# Patient Record
Sex: MALE | Race: WHITE | Employment: FULL TIME | URBAN - METROPOLITAN AREA
[De-identification: names, ages, dates, MRNs, and addresses within clinical notes are randomized per-mention and may not be internally consistent; named-entity substitution may affect disease eponyms.]

---

## 2018-02-24 ENCOUNTER — NURSE TRIAGE (OUTPATIENT)
Dept: NURSING | Facility: CLINIC | Age: 33
End: 2018-02-24

## 2018-02-24 NOTE — TELEPHONE ENCOUNTER
He wanted to know the urgent care hours and if he needs an appointment. I reviewed the hours with him and told him it's on a walk in basis.  Ana Paula Yanez RN-[facility]

## 2018-10-25 ENCOUNTER — NURSE TRIAGE (OUTPATIENT)
Dept: NURSING | Facility: CLINIC | Age: 33
End: 2018-10-25

## 2018-10-25 ENCOUNTER — HOSPITAL ENCOUNTER (EMERGENCY)
Facility: CLINIC | Age: 33
Discharge: HOME | End: 2018-10-25
Attending: EMERGENCY MEDICINE | Admitting: EMERGENCY MEDICINE
Payer: COMMERCIAL

## 2018-10-25 ENCOUNTER — APPOINTMENT (OUTPATIENT)
Dept: CT IMAGING | Facility: CLINIC | Age: 33
End: 2018-10-25
Attending: EMERGENCY MEDICINE
Payer: COMMERCIAL

## 2018-10-25 VITALS
OXYGEN SATURATION: 98 % | HEIGHT: 74 IN | WEIGHT: 208 LBS | DIASTOLIC BLOOD PRESSURE: 89 MMHG | HEART RATE: 82 BPM | SYSTOLIC BLOOD PRESSURE: 140 MMHG | BODY MASS INDEX: 26.69 KG/M2 | TEMPERATURE: 97.3 F | RESPIRATION RATE: 18 BRPM

## 2018-10-25 DIAGNOSIS — S09.93XA SOFT PALATE INJURY, INITIAL ENCOUNTER: ICD-10-CM

## 2018-10-25 PROCEDURE — 25000128 H RX IP 250 OP 636: Performed by: EMERGENCY MEDICINE

## 2018-10-25 PROCEDURE — 99284 EMERGENCY DEPT VISIT MOD MDM: CPT | Mod: 25

## 2018-10-25 PROCEDURE — 70491 CT SOFT TISSUE NECK W/DYE: CPT

## 2018-10-25 RX ORDER — IBUPROFEN 100 MG/5ML
600 SUSPENSION, ORAL (FINAL DOSE FORM) ORAL ONCE
Status: DISCONTINUED | OUTPATIENT
Start: 2018-10-25 | End: 2018-10-25 | Stop reason: HOSPADM

## 2018-10-25 RX ORDER — CEPHALEXIN 500 MG/1
500 CAPSULE ORAL 3 TIMES DAILY
Qty: 21 CAPSULE | Refills: 0 | Status: SHIPPED | OUTPATIENT
Start: 2018-10-25 | End: 2018-11-01

## 2018-10-25 RX ORDER — IOPAMIDOL 755 MG/ML
500 INJECTION, SOLUTION INTRAVASCULAR ONCE
Status: COMPLETED | OUTPATIENT
Start: 2018-10-25 | End: 2018-10-25

## 2018-10-25 RX ADMIN — SODIUM CHLORIDE 65 ML: 9 INJECTION, SOLUTION INTRAVENOUS at 08:56

## 2018-10-25 RX ADMIN — IOPAMIDOL 80 ML: 755 INJECTION, SOLUTION INTRAVENOUS at 08:56

## 2018-10-25 ASSESSMENT — ENCOUNTER SYMPTOMS
SHORTNESS OF BREATH: 0
SPEECH DIFFICULTY: 0
SORE THROAT: 1
WOUND: 1
TROUBLE SWALLOWING: 0

## 2018-10-25 NOTE — ED AVS SNAPSHOT
[facility] Emergency Department    [address]    [facility] [address]    Phone:  [phone]    Fax:  [phone]                                       Felipe Mckee   MRN: [id number]    Department:  [facility] Emergency Department   Date of Visit:  10/25/2018           After Visit Summary Signature Page     I have received my discharge instructions, and my questions have been answered. I have discussed any challenges I see with this plan with the nurse or doctor.    ..........................................................................................................................................  Patient/Patient Representative Signature      ..........................................................................................................................................  Patient Representative Print Name and Relationship to Patient    ..................................................               ................................................  Date                                   Time    ..........................................................................................................................................  Reviewed by Signature/Title    ...................................................              ..............................................  Date                                               Time          22EPIC Rev 08/18

## 2018-10-25 NOTE — TELEPHONE ENCOUNTER
"Patient calling reporting having \"an accident in the shower this morning my tooth brush hit the back of my throat.\" Patient reporting puncture wound to back of throat. Area continues to bleed. Denies difficulty breathing.   Denies feeling dizzy or lightheaded.  Advised Emergency Room. Patient agrees to go to [facility].    Kristin Carlin RN  [facility]         Reason for Disposition    Injury to the back of the throat, tonsil or soft palate  (e.g., pencil or other sharp object placed in mouth)    Additional Information    Negative: [1] Severe difficulty swallowing (e.g., drooling or spitting) AND [2] started suddenly after taking a medicine or allergic food    Negative: [1] Swollen tongue AND [2] sudden onset    Negative: Wheezing, stridor, hoarseness, or difficulty breathing    Negative: Sounds like a life-threatening emergency to the triager    Negative: Mouth ulcers are seen    Negative: Sore throat (throat pain with swallowing)    Negative: Swallowed a (non-edible) foreign body    Negative: Feeding tube, questions or concerns related to    Negative: Swelling of tongue    Negative: [1] Symptoms of blocked esophagus (e.g., can't swallow normal secretions, drooling) AND [2] present now    Negative: Symptoms of food or bone stuck in throat or esophagus (e.g., pain in throat or chest, FB sensation, blood-tinged saliva)    Negative: [1] Severe difficulty swallowing (e.g., drooling or spitting, can't swallow water) AND [2] new onset AND [3] normal breathing    Negative: Gaping cut of OUTER LIP  (or length > 1/4 inch or 6 mm)    Negative: Gaping cut through border of the LIP where it meets the skin  (or length > 1/4 inch or 6 mm)    Negative: Cut of side or tip of TONGUE that gapes open (split tongue)    Negative: Cut on surface of TONGUE > 1 inch (24 mm) and that gapes open    Negative: [1] Bleeding AND [2] won't stop after 10 minutes of direct pressure (using correct technique)    Negative: " Can't open or close the mouth fully    Negative: Sounds like a serious injury to the triager    Negative: Main injury is to the teeth    Negative: [1] Major bleeding (e.g., actively dripping or spurting) AND [2] can't be stopped    Negative: Fainted or too weak to stand following large blood loss    Negative: Knocked out (unconscious) > 1 minute    Negative: Difficult to awaken or acting confused  (e.g., disoriented, slurred speech)    Negative: Difficulty breathing    Negative: Severe neck pain    Negative: Sounds like a life-threatening emergency to the triager    Protocols used: MOUTH INJURY-ADULT-AH, SWALLOWING DIFFICULTY-ADULT-AH

## 2018-10-25 NOTE — ED PROVIDER NOTES
"  History     Chief Complaint:  Throat Injury    HPI   Felipe Mckee is an otherwise healthy 33 year old male who presents to the ED with a puncture wound to the back of his throat. The patient reports that this morning, around 0700, he was brushing his teeth in the shower when he slipped and his toothbrush lodged into the back of his throat resulting in a puncture wound. He has since been spitting up blood but denies numbness, dizziness, weakness or headache. While in ED the patient reports pain and localized swelling without trouble breathing or swallowing. He has not yet taken anything for pain or swelling control. No other symptoms reported such as changes in speech.     Allergies:  No known drug allergies    Medications:    The patient is not currently taking any prescribed medications.    Past Medical History:    The patient does not have any past pertinent medical history.    Past Surgical History:    History reviewed. No pertinent surgical history.    Family History:    History reviewed. No pertinent family history.     Social History:  The patient was not accompanied to the ED.  Smoking Status: Not on file  Smokeless Tobacco: Not on file  Alcohol Use: Not on file     Review of Systems   HENT: Positive for sore throat. Negative for trouble swallowing.         Positive for throat swelling    Respiratory: Negative for shortness of breath.    Skin: Positive for wound.   Neurological: Negative for speech difficulty.   All other systems reviewed and are negative.    Physical Exam     Patient Vitals for the past 24 hrs:   BP Temp Temp src Pulse Resp SpO2 Height Weight   10/25/18 0734 140/89 97.3  F (36.3  C) Temporal 82 18 98 % 1.88 m (6' 2\") 94.3 kg (208 lb)       Physical Exam   HENT:   Mouth/Throat:         General: the patient is awake and interactive; speaking in full sentences  HEENT:  Moist mucous membranes, conjunctiva normal; left posterior soft palate with 1 cm circular puncture wound; no active " bleeding; notable small blood clot to the puncture site; uvula midline; no soft palate swelling  Pulmonary:  Normal respiratory effort; no stridor  Cardiovascular:  Well perfused  Musculoskeletal:  Moving 4 extremities grossly wnl, no deformities  Neuro:  Speech normal, no focal deficits; CN II- XII grossly intact  Psych:  Normal affect; speech clear    Emergency Department Course     Imaging:  Radiology findings were communicated with the patient who voiced understanding of the findings.    Soft Tissue Neck CT w Contrast:  Normal CT scan of the neck.    As read by radiology    Interventions:  0856 - Isovue-370 solution 500 mL 80 mLs IV  0901 - NS Bolus 1,000mL IV    Emergency Department Course:  Nursing notes and vitals reviewed.    The patient was sent for CT imaging while in the emergency department, results above.     0744: I performed an exam of the patient as documented above.     0952: Patient rechecked and updated.     1027 : RN spoke with ENT service regarding patient's presentation, findings, and plan of care.  Unfortunately I was with a critical patient when ENT called.  Recommended 7 day course of antibiotic (Keflex or Clindamycin).  I was able to discuss this with the patient.    Findings and plan explained to the Patient. Patient discharged home with instructions regarding supportive care, medications, and reasons to return. The importance of close follow-up was reviewed.     Impression & Plan      Medical Decision Making:  Felipe Mckee is a 33 year old male with no significant past medical history presents the emergency department for evaluation of puncture wound to the soft palate as noted in HPI.  Initially had some bleeding and feels like there is some swelling but no evidence of active bleeding or significant swelling noted on exam.  He otherwise has no difficulty with swallowing or breathing.  Denies any neurologic symptoms and his neurologic exam is completely nonfocal.  Soft palate exam as  above shows 1 cm circular puncture wound to the soft palate laterally.  No other evidence of intraoral injury.  Discussed given the location of injury possible neurovascular damage and recommended CT scan and patient was in agreement with this.  CT soft tissue neck was otherwise unremarkable.  He did not have any worsening of his swelling and no change in his voice while in the emergency department.  RN was able to speak with on-call ENT regarding patient's presentation and unfortunately was unable to take this call as I was with a critical patient at the time.  Per RN, on-call ENT recommended starting 7 day course of antibiotic and this was discussed with the patient.  He was in agreement with this.  Discussed the natural course of this injury and at this time I do not feel that any ED suturing or OR repair is indicated.  Follow-up with primary as discussed at bedside.  He will return for any new or worsening symptoms.    Diagnosis:    ICD-10-CM    1. Soft palate injury, initial encounter S09.93XA        Disposition:  discharged to home      Viri Whitlock  10/25/2018   [facility] EMERGENCY DEPARTMENT  IViri am serving as a scribe at 7:44 AM on 10/25/2018 to document services personally performed by Tono Terrell MD based on my observations and the provider's statements to me.       Tono Terrell MD  10/25/18 2569

## 2018-10-25 NOTE — ED AVS SNAPSHOT
[facility] Emergency Department    [address]    [facility] [address]    Phone:  [phone]    Fax:  [phone]                                       Felipe Mckee   MRN: [id number]    Department:  [facility] Emergency Department   Date of Visit:  10/25/2018           Patient Information     Date Of Birth          1985        Your diagnoses for this visit were:     Soft palate injury, initial encounter        You were seen by Tono Terrell MD.      Follow-up Information     Follow up with [facility]- In 1 week.    Why:  As needed    Contact information:    [address]  [facility] [address]  [phone]          Follow up with [facility] Emergency Department.    Specialty:  EMERGENCY MEDICINE    Why:  If symptoms worsen, you develop fever, inability to swallow, have difficulty breathing, numbness/weakness/tingling of your extremities or any new concerning symptom    Contact information:    [address]  [facility] [id number] [phone]        Discharge Instructions       Your CT neck was reassuring and did not show any damage to the deep neck structures (i.e. Blood vessels).    Soft palate injuries heal well without any intervention (i.e. Sutures/stitching).      Please follow up with your primary care doctor in the next week as needed.    You can take Tylenol 325 mg by mouth or Ibuprofen 400 mg by mouth every 4-6 hours as needed for pain.    You are welcome to return to the emergency department if symptoms worsen, you develop fever, inability to swallow, have difficulty breathing, numbness/weakness/tingling of your extremities or any new concerning symptom          24 Hour Appointment Hotline       To make an appointment at any [facility]3], call [phone] ([phone]). If you don't have a family doctor or clinic, we will help you find one. [facility] are  conveniently located to serve the needs of you and your family.             Review of your medicines      START taking        Dose / Directions Last dose taken    cephALEXin 500 MG capsule   Commonly known as:  KEFLEX   Dose:  500 mg   Quantity:  21 capsule        Take 1 capsule (500 mg) by mouth 3 times daily for 7 days   Refills:  0                Prescriptions were sent or printed at these locations (1 Prescription)                   Other Prescriptions                Printed at Department/Unit printer (1 of 1)         cephALEXin (KEFLEX) 500 MG capsule                Procedures and tests performed during your visit     Soft tissue neck CT w contrast      Orders Needing Specimen Collection     None      Pending Results     Date and Time Order Name Status Description    10/25/2018 0751 Soft tissue neck CT w contrast Preliminary             Pending Culture Results     No orders found from 10/23/2018 to 10/26/2018.            Pending Results Instructions     If you had any lab results that were not finalized at the time of your Discharge, you can call the ED Lab Result RN at [phone]. You will be contacted by this team for any positive Lab results or changes in treatment. The nurses are available 7 days a week from 10A to 6:30P.  You can leave a message 24 hours per day and they will return your call.        Test Results From Your Hospital Stay        10/25/2018  9:24 AM      Narrative     CT SCAN OF THE NECK WITH CONTRAST  10/25/2018 9:09 AM     HISTORY: Toothbrush puncture wound to left soft palate.     TECHNIQUE: Axial images and coronal reformations. 80 mL Isovue-370 IV.  Radiation dose for this scan was reduced using automated exposure  control, adjustment of the mA and/or kV according to patient size, or  iterative reconstruction technique.    COMPARISON: None.    FINDINGS: There is no evidence for any soft tissue hematoma or any  significant injury to the left soft palate. There is no  adjacent  hematoma    Visualized sinuses, nasopharynx and orbits: Normal.      Tongue, oral cavity and oropharynx: Normal.      Hypopharynx: Normal.      Larynx and trachea: Normal.      Thyroid: Normal.    Submandibular glands: Normal.      Parotid glands: Normal.        Lymph nodes: Normal.      Vasculature: Normal.      Upper mediastinum and lungs: Normal.      Bones: Normal.        Impression     IMPRESSION: Normal CT scan of the neck.                Clinical Quality Measure: Blood Pressure Screening     Your blood pressure was checked while you were in the emergency department today. The last reading we obtained was  BP: 140/89 . Please read the guidelines below about what these numbers mean and what you should do about them.  If your systolic blood pressure (the top number) is less than 120 and your diastolic blood pressure (the bottom number) is less than 80, then your blood pressure is normal. There is nothing more that you need to do about it.  If your systolic blood pressure (the top number) is 120-139 or your diastolic blood pressure (the bottom number) is 80-89, your blood pressure may be higher than it should be. You should have your blood pressure rechecked within a year by a primary care provider.  If your systolic blood pressure (the top number) is 140 or greater or your diastolic blood pressure (the bottom number) is 90 or greater, you may have high blood pressure. High blood pressure is treatable, but if left untreated over time it can put you at risk for heart attack, stroke, or kidney failure. You should have your blood pressure rechecked by a primary care provider within the next 4 weeks.  If your provider in the emergency department today gave you specific instructions to follow-up with your doctor or provider even sooner than that, you should follow that instruction and not wait for up to 4 weeks for your follow-up visit.        Thank you for choosing [facility]       Thank you for choosing  "[facility] for your care. Our goal is always to provide you with excellent care. Hearing back from our patients is one way we can continue to improve our services. Please take a few minutes to complete the written survey that you may receive in the mail after you visit with us. Thank you!        [employer] Information     [employer] lets you send messages to your doctor, view your test results, renew your prescriptions, schedule appointments and more. To sign up, go to [email or web address] . Click on \"Log in\" on the left side of the screen, which will take you to the Welcome page. Then click on \"Sign up Now\" on the right side of the page.     You will be asked to enter the access code listed below, as well as some personal information. Please follow the directions to create your username and password.     Your access code is: [email or web address]  Expires: 2019 10:03 AM     Your access code will  in 90 days. If you need help or a new code, please call your [facility] clinic or [phone].        Care EveryWhere ID     This is your Care EveryWhere ID. This could be used by other organizations to access your [facility] medical records  [id number]        Equal Access to Services     ZAFAR NELSON : Luis A Hines, gloria ingram, qatashia chapman, bebeto borges. So [facility] [phone].    ATENCIÓN: Si habla español, tiene a adhikari disposición servicios gratuitos de asistencia lingüística. Llame al [phone].    We comply with applicable federal civil rights laws and [place] laws. We do not discriminate on the basis of race, color, national origin, age, disability, sex, sexual orientation, or gender identity.            After Visit Summary       This is your record. Keep this with you and show to your community pharmacist(s) and doctor(s) at your next visit.                  "

## 2018-10-25 NOTE — ED TRIAGE NOTES
ABCs intact. Pt was brushing his teeth in the shower and slipped. Pt punctured back of throat about 0700. Pt still has bleeding.

## 2018-10-25 NOTE — DISCHARGE INSTRUCTIONS
Your CT neck was reassuring and did not show any damage to the deep neck structures (i.e. Blood vessels).    Soft palate injuries heal well without any intervention (i.e. Sutures/stitching).      Please follow up with your primary care doctor in the next week as needed.    You can take Tylenol 325 mg by mouth or Ibuprofen 400 mg by mouth every 4-6 hours as needed for pain.    You are welcome to return to the emergency department if symptoms worsen, you develop fever, inability to swallow, have difficulty breathing, numbness/weakness/tingling of your extremities or any new concerning symptom

## 2018-10-25 NOTE — LETTER
October 25, 2018      To Whom It May Concern:      Felipe Mckee was seen in our Emergency Department today, 10/25/18.  I expect his condition to improve over the next 2-3 days.  He may return to work when improved.    Sincerely,        Tono Terrell MD

## 2025-06-26 ENCOUNTER — APPOINTMENT (OUTPATIENT)
Age: 40
Setting detail: DERMATOLOGY
End: 2025-06-26

## 2025-06-26 DIAGNOSIS — L82.1 OTHER SEBORRHEIC KERATOSIS: ICD-10-CM

## 2025-06-26 DIAGNOSIS — L30.9 DERMATITIS, UNSPECIFIED: ICD-10-CM

## 2025-06-26 DIAGNOSIS — Z71.89 OTHER SPECIFIED COUNSELING: ICD-10-CM

## 2025-06-26 DIAGNOSIS — D17 BENIGN LIPOMATOUS NEOPLASM: ICD-10-CM

## 2025-06-26 DIAGNOSIS — D22 MELANOCYTIC NEVI: ICD-10-CM

## 2025-06-26 DIAGNOSIS — D18.0 HEMANGIOMA: ICD-10-CM

## 2025-06-26 PROBLEM — D18.01 HEMANGIOMA OF SKIN AND SUBCUTANEOUS TISSUE: Status: ACTIVE | Noted: 2025-06-26

## 2025-06-26 PROBLEM — D22.5 MELANOCYTIC NEVI OF TRUNK: Status: ACTIVE | Noted: 2025-06-26

## 2025-06-26 PROBLEM — D17.1 BENIGN LIPOMATOUS NEOPLASM OF SKIN AND SUBCUTANEOUS TISSUE OF TRUNK: Status: ACTIVE | Noted: 2025-06-26

## 2025-06-26 PROCEDURE — ? SUNSCREEN RECOMMENDATIONS

## 2025-06-26 PROCEDURE — ? COUNSELING

## 2025-06-26 PROCEDURE — ? PRESCRIPTION

## 2025-06-26 PROCEDURE — ? ADDITIONAL NOTES

## 2025-06-26 PROCEDURE — ? PRESCRIPTION MEDICATION MANAGEMENT

## 2025-06-26 RX ORDER — CLOBETASOL PROPIONATE 0.5 MG/G
OINTMENT TOPICAL
Qty: 60 | Refills: 3 | Status: ERX | COMMUNITY
Start: 2025-06-26

## 2025-06-26 RX ADMIN — CLOBETASOL PROPIONATE: 0.5 OINTMENT TOPICAL at 00:00

## 2025-06-26 ASSESSMENT — LOCATION DETAILED DESCRIPTION DERM
LOCATION DETAILED: RIGHT VENTRAL PROXIMAL FOREARM
LOCATION DETAILED: RIGHT DISTAL POSTERIOR THIGH
LOCATION DETAILED: LEFT DISTAL PRETIBIAL REGION
LOCATION DETAILED: SUPERIOR LUMBAR SPINE
LOCATION DETAILED: SUPERIOR THORACIC SPINE
LOCATION DETAILED: RIGHT SUPERIOR MEDIAL MIDBACK
LOCATION DETAILED: LEFT VENTRAL PROXIMAL FOREARM
LOCATION DETAILED: EPIGASTRIC SKIN
LOCATION DETAILED: MIDDLE STERNUM
LOCATION DETAILED: LEFT PROXIMAL POSTERIOR THIGH
LOCATION DETAILED: RIGHT DISTAL PRETIBIAL REGION
LOCATION DETAILED: LEFT INFERIOR MEDIAL FOREHEAD
LOCATION DETAILED: INFERIOR THORACIC SPINE

## 2025-06-26 ASSESSMENT — LOCATION ZONE DERM
LOCATION ZONE: ARM
LOCATION ZONE: TRUNK
LOCATION ZONE: LEG
LOCATION ZONE: FACE

## 2025-06-26 ASSESSMENT — LOCATION SIMPLE DESCRIPTION DERM
LOCATION SIMPLE: LEFT FOREHEAD
LOCATION SIMPLE: LEFT PRETIBIAL REGION
LOCATION SIMPLE: ABDOMEN
LOCATION SIMPLE: RIGHT PRETIBIAL REGION
LOCATION SIMPLE: LEFT FOREARM
LOCATION SIMPLE: RIGHT FOREARM
LOCATION SIMPLE: RIGHT POSTERIOR THIGH
LOCATION SIMPLE: LOWER BACK
LOCATION SIMPLE: LEFT POSTERIOR THIGH
LOCATION SIMPLE: UPPER BACK
LOCATION SIMPLE: RIGHT LOWER BACK
LOCATION SIMPLE: CHEST

## 2025-06-26 NOTE — HPI: RASH
What Type Of Note Output Would You Prefer (Optional)?: Bullet Format
How Severe Is Your Rash?: mild
Is This A New Presentation, Or A Follow-Up?: Rash
Additional History: Patient was prescribed clobetasol 0.05% from a PCP for poison ivy. Every summer patient presents with rash.

## 2025-06-26 NOTE — PROCEDURE: ADDITIONAL NOTES
Additional Notes: We discussed consulting with an allergist as rash seems to correlate with this season every year x 15 years and adding on Zyrtec qhs in addition to Claritin qAM or add Benadryl qhs if severe flare. We recommend OTC hydrocortisone 1% cream bid for affected areas of the face. Discussed punch biopsy, patient deferred at this time.
Detail Level: Simple
Render Risk Assessment In Note?: no

## 2025-06-26 NOTE — PROCEDURE: PRESCRIPTION MEDICATION MANAGEMENT
Detail Level: Zone
Render In Strict Bullet Format?: No
Initiate Treatment: Apply clobetasol 0.05 % topical ointment to arms and legs twice daily for 2-3 weeks then twice daily as needed.